# Patient Record
Sex: MALE
[De-identification: names, ages, dates, MRNs, and addresses within clinical notes are randomized per-mention and may not be internally consistent; named-entity substitution may affect disease eponyms.]

---

## 2024-11-22 ENCOUNTER — HOSPITAL ENCOUNTER (OUTPATIENT)
Dept: PHYSICAL THERAPY | Facility: CLINIC | Age: 69
Setting detail: THERAPIES SERIES
Discharge: HOME | End: 2024-11-22
Payer: MEDICARE

## 2024-11-22 PROCEDURE — 97110 THERAPEUTIC EXERCISES: CPT

## 2024-11-22 PROCEDURE — 97161 PT EVAL LOW COMPLEX 20 MIN: CPT

## 2024-11-22 NOTE — CONSULTS
[] [facility]  Outpatient Rehabilitation &  Therapy  [address]  [phone]  F: [phone] [] [facility]  Outpatient Rehabilitation &  Therapy  [address]   [address]  P: [phone]  F: [phone] [x] [facility]  Outpatient Rehabilitation &  Therapy  [address]  P: [phone]  F: [phone] [] [facility]  Outpatient Rehabilitation &  Therapy  [address]  P: [phone]  F: [phone] [] [facility]  Outpatient Rehabilitation &  Therapy  [address]   [address]   P: [phone]  F: [phone]  [] [facility]  Outpatient Rehabilitation &  Therapy  [address]   P: [phone]  F: [phone] [] [facility]  Outpatient Rehabilitation &  Therapy  [address]  [address]  P: [phone]  F: [phone] [] [facility]  Outpatient Rehabilitation &  Therapy  [address]  P: [phone]  F: [phone] [] [facility]  Outpatient Rehabilitation &  Therapy  [address]  P: [phone]  F: [phone]      Physical Therapy Spine Evaluation    Date:  2024  Patient: Hay Blake  : 1955  MRN: [id number]  Physician: Chris Ervin MD    Insurance: Primary Insurance - MEDICARE based on medical necessity; [place] Insurance - [place], no hard max, no prior auth  Medical Diagnosis: M54.59 (ICD-10-CM) - Other low back pain, M62.830 (ICD-10-CM) - Muscle spasm of back   Rehab Codes: M54.50  Onset Date: 10/11/2024  Next 's appt.: unknown    Subjective: \"Rk\" is a 69 year old male that presents with LBP that has been ongoing since \"early October\". Pt denies acute episode that caused sudden increase in pain, however it has ached for the past 6 weeks. Pt states that about 6 weeks ago, he was repairing his wife's car and \"crawling around\" on the

## 2024-11-25 ENCOUNTER — HOSPITAL ENCOUNTER (OUTPATIENT)
Dept: PHYSICAL THERAPY | Facility: CLINIC | Age: 69
Setting detail: THERAPIES SERIES
Discharge: HOME | End: 2024-11-25
Payer: MEDICARE

## 2024-11-25 PROCEDURE — 97113 AQUATIC THERAPY/EXERCISES: CPT

## 2024-11-25 NOTE — FLOWSHEET NOTE
[x] [facility] Outpatient Rehabilitation & Therapy  [address]  P: [phone]  F: [phone]     Physical Therapy Daily  Aquatic Treatment Note    Date:  2024  Patient Name:  Hay Blake    :  1955  MRN: [id number]  Physician: Chris Ervin MD                                    Insurance: Primary Insurance - MEDICARE based on medical necessity; [place] Insurance - [place], no hard max, no prior auth  Medical Diagnosis: M54.59 (ICD-10-CM) - Other low back pain, M62.830 (ICD-10-CM) - Muscle spasm of back         Rehab Codes: M54.50  Onset Date: 10/11/2024                    Next 's appt.: unknown    Visit# / total visits:   PROGRESS  NOTE VISIT 10  Cancels/No Shows: 0/0    Subjective:    Pain:  [x] Yes  [] No Location: LB Pain Rating: (0-10 scale) 2/10  Pain altered Tx:  [x] No  [] Yes  Action:  Comments: Pt c/o soreness upon arrival. States he always has pain, standing is usually the least painful.     Objective:     KEY  B = Belt G = Gloves N = Noodle   C = Cuffs K = Kickboard P = Paddles   CC = Cervical Collar L = Laps T = Theratube   DB = Dumbells M = Minutes W = Weights     Exercises/Activities  Warm-up/Amb  Dynamic Exercises    Forward 3L March    Sideways 3L Squat    Backwards 3L Retro HS curls      Retro SLR    Stretches  Braiding    Gastroc/Soleus  Heel to Toe amb    Hamstring 1'x2 Toe amb    Hip flexor  Heel amb    Piriformis      SKTC      Pec Stretch      Post Deltoid  Static Exercises UE gloves   Lumbar flex 10\"x5 Shoulder flex/ext 15   Static Exercises LE  Shoulder abd/add 15   Heel/toe raises 15 Shoulder H.  abd/add 15   Marches 15 Shoulder IR/ER    Mini-squats  Rowing    4-way hip  15 Arm Circles    Hamstring curls 15 UT shrugs/rolls    Hip Circles/Fig 8  Scap squeezes 10x5\"   Ankle ROM  Diagonals 1/2    Lunges   Elbow flex/ext      Pron/Sup    Functional Exercise  Wrist AROM    Step      Wall Push-ups  Deep H20/    SLS  Bike 2m 
Discharged

## 2024-12-02 ENCOUNTER — HOSPITAL ENCOUNTER (OUTPATIENT)
Dept: PHYSICAL THERAPY | Facility: CLINIC | Age: 69
Setting detail: THERAPIES SERIES
Discharge: HOME | End: 2024-12-02
Payer: MEDICARE

## 2024-12-02 PROCEDURE — 97113 AQUATIC THERAPY/EXERCISES: CPT

## 2024-12-02 NOTE — FLOWSHEET NOTE
AROM    Step 10x ea fwd, lat     Wall Push-ups  Deep H20/    SLS  Bike 2m   Breast Stroke on Noodle  Hip abd/add 2m   Noodle Twist  Hip flex/ext    Noodle Push down  Hip IR/ER    Kickboard push/pull 20 Knee flex/ext      Push/pull on Rail      Hang 5m   Other:    Specific Instructions for next treatment: Aquatics - Lumbar ROM, hamstring flexibility and hip  strengthening to pt tolerance.     Treatment Charges: Mins Units   []  Modalities     []  Ther Exercise     []  Manual Therapy     []  Ther Activities     [x]  Aquatics 30 2   []  Other       Assessment: [x] Progressing toward goals.  Progressed ex per above log with emphasis on core strengthening and stability. Progressions include kickboard push/pull, step ups, mini squats, as well as increased reps. Demo good tolerance, without noted pain or fatigue. Verbal cues and demo required for proper tech with mini squats and avoiding valgus collapse. Will progress as charleen.     [] No change.     [x] Other: Patient would benefit from skilled physical therapy services in order to: improve upon deficits listed above that are affecting his ability to sleep, sit, lift objects, and complete ADL's without pain and compensated movement patterns.      STG: (to be met in 8 treatments)  ? Pain: <4/10 pain consistently to improve sleep.   ? ROM: </= 58 cm from the floor with lateral lumbar flexion bilaterally to improve pts ability to bend to  an object from the floor.   ? Strength: 4+/5 JV hip abduction strength to reduce compensated movement patterns with ambulation.   Patient to be independent with home exercise program as demonstrated by performance with correct form without cues.  LTG: (to be met in 16 treatments)  Pt to improve score on the MODS to </= 15/50 points to demonstrate improvement in pts ability to lift, sit, standing, sleep and travel without increased pain    Pt. Education:  [x] Yes  [] No  [] Reviewed Prior HEP/Ed  Method of Education: [x] Verbal  [x]

## 2024-12-05 ENCOUNTER — HOSPITAL ENCOUNTER (OUTPATIENT)
Dept: PHYSICAL THERAPY | Facility: CLINIC | Age: 69
Setting detail: THERAPIES SERIES
Discharge: HOME | End: 2024-12-05
Payer: MEDICARE

## 2024-12-05 PROCEDURE — 97113 AQUATIC THERAPY/EXERCISES: CPT

## 2024-12-05 NOTE — FLOWSHEET NOTE
[x] [facility] Outpatient Rehabilitation & Therapy  [address]  P: [phone]  F: [phone]     Physical Therapy Daily  Aquatic Treatment Note    Date:  2024  Patient Name:  Hay Blake    :  1955  MRN: [id number]  Physician: Chris Ervin MD                                    Insurance: Primary Insurance - MEDICARE based on medical necessity; [place] Insurance - [place], no hard max, no prior auth  Medical Diagnosis: M54.59 (ICD-10-CM) - Other low back pain, M62.830 (ICD-10-CM) - Muscle spasm of back         Rehab Codes: M54.50  Onset Date: 10/11/2024                    Next 's appt.: unknown    Visit# / total visits:   PROGRESS  NOTE VISIT 10  Cancels/No Shows: 0/0    Subjective:    Pain:  [x] Yes  [] No Location: LB Pain Rating: (0-10 scale) 1-2/10  Pain altered Tx:  [x] No  [] Yes  Action:  Comments: Pt reporting \"the usual\" pain levels this am. Notes minimal soreness after last treatment     Objective:   Precautions [] No  [x] Yes: Partial lumbarization of the sacrum.   KEY  B = Belt G = Gloves N = Noodle   C = Cuffs K = Kickboard P = Paddles   CC = Cervical Collar L = Laps T = Theratube   DB = Dumbells M = Minutes W = Weights     Exercises/Activities  Warm-up/Amb 12/5 Dynamic Exercises 12/5   Forward 3L March 3L   Sideways 3L Squat    Backwards 3L Retro HS curls      Retro SLR    Stretches  Braiding    Gastroc/Soleus  Heel to Toe amb    Hamstring 1'x2 Toe amb    Hip flexor  Heel amb    Piriformis      SKTC      Pec Stretch      Post Deltoid  Static Exercises UE Open paddles   Lumbar flex 10\"x5 Shoulder flex/ext 20   Static Exercises LE  Shoulder abd/add 20   Heel/toe raises 30 Shoulder H.  abd/add 20    30 Shoulder IR/ER 20   Mini-squats 30 Rowing    4-way hip  30 Arm Circles    Hamstring curls 30 UT shrugs/rolls    Hip Circles/Fig 8  Scap squeezes 10x5\"   Ankle ROM  Diagonals 1/2    Lunges  10 ea Elbow flex/ext      Pron/Sup    Functional

## 2024-12-09 ENCOUNTER — HOSPITAL ENCOUNTER (OUTPATIENT)
Dept: PHYSICAL THERAPY | Facility: CLINIC | Age: 69
Setting detail: THERAPIES SERIES
Discharge: HOME | End: 2024-12-09
Payer: MEDICARE

## 2024-12-09 PROCEDURE — 97113 AQUATIC THERAPY/EXERCISES: CPT

## 2024-12-09 NOTE — FLOWSHEET NOTE
[x] [facility] Outpatient Rehabilitation & Therapy  [address]  P: [phone]  F: [phone]     Physical Therapy Daily  Aquatic Treatment Note    Date:  2024  Patient Name:  Hay Blake    :  1955  MRN: [id number]  Physician: Chris Ervin MD                                    Insurance: Primary Insurance - MEDICARE based on medical necessity; [place] Insurance [place], no hard max, no prior auth  Medical Diagnosis: M54.59 (ICD-10-CM) - Other low back pain, M62.830 (ICD-10-CM) - Muscle spasm of back         Rehab Codes: M54.50  Onset Date: 10/11/2024                    Next 's appt.: unknown    Visit# / total visits:   PROGRESS  NOTE VISIT 10  Cancels/No Shows: 0/0    Subjective:    Pain:  [x] Yes  [] No Location: LB Pain Rating: (0-10 scale) 1-2/10  Pain altered Tx:  [x] No  [] Yes  Action:  Comments: Pt reports back was very achy last night keeping him up most of the night, but then pain eased this morning on its own.     Objective:   Precautions [] No  [x] Yes: Partial lumbarization of the sacrum.   KEY  B = Belt G = Gloves N = Noodle   C = Cuffs K = Kickboard P = Paddles   CC = Cervical Collar L = Laps T = Theratube   DB = Dumbells M = Minutes W = Weights     Exercises/Activities  Warm-up/Amb  Dynamic Exercises    Forward 3L March 3L   Sideways 3L Squat    Backwards 3L Retro HS curls      Retro SLR    Stretches  Braiding    Gastroc/Soleus  Heel to Toe amb    Hamstring 1'x2 Toe amb    Hip flexor  Heel amb    Piriformis      SKTC      Pec Stretch      Post Deltoid  Static Exercises UE Open paddles   Lumbar flex 10\"x5 Shoulder flex/ext 30   QL stretch*      Static Exercises LE  Shoulder abd/add 30   Heel/toe raises 30 Shoulder H.  abd/add 30   Marches 30 Shoulder IR/ER 30   Mini-squats 30 Rowing    4-way hip  30 Arm Circles    Hamstring curls 30 UT shrugs/rolls    Hip Circles/Fig 8  Scap squeezes 10x5\"   Ankle ROM  Diagonals 1/2    Lunges  20

## 2024-12-12 ENCOUNTER — HOSPITAL ENCOUNTER (OUTPATIENT)
Dept: PHYSICAL THERAPY | Facility: CLINIC | Age: 69
Setting detail: THERAPIES SERIES
Discharge: HOME | End: 2024-12-12
Payer: MEDICARE

## 2024-12-12 PROCEDURE — 97113 AQUATIC THERAPY/EXERCISES: CPT

## 2024-12-12 NOTE — FLOWSHEET NOTE
[x] [facility] Outpatient Rehabilitation & Therapy  [address]  P: [phone]  F: [phone]     Physical Therapy Daily  Aquatic Treatment Note    Date:  2024  Patient Name:  Hay Blake    :  1955  MRN: [id number]  Physician: Chris Ervin MD                                    Insurance: Primary Insurance - MEDICARE based on medical necessity; [place] Insurance - [place], no hard max, no prior auth  Medical Diagnosis: M54.59 (ICD-10-CM) - Other low back pain, M62.830 (ICD-10-CM) - Muscle spasm of back         Rehab Codes: M54.50  Onset Date: 10/11/2024                    Next 's appt.: unknown    Visit# / total visits:   PROGRESS  NOTE VISIT 10  Cancels/No Shows: 0/0    Subjective:    Pain:  [] Yes  [x] No Location: LB Pain Rating: (0-10 scale) 0/10  Pain altered Tx:  [x] No  [] Yes  Action:  Comments: Pt reports back has felt really good the last couple days with minimal pain.     Objective:   Precautions [] No  [x] Yes: Partial lumbarization of the sacrum.   KEY  B = Belt G = Gloves N = Noodle   C = Cuffs K = Kickboard P = Paddles   CC = Cervical Collar L = Laps T = Theratube   DB = Dumbells M = Minutes W = Weights     Exercises/Activities  Warm-up/Amb  Dynamic Exercises    Forward 3L March 3L   Sideways 3L Squat    Backwards 3L Retro HS curls      Retro SLR    Stretches  Braiding    Gastroc/Soleus  Heel to Toe amb    Hamstring 1'x2 Toe amb    Hip flexor  Heel amb    Piriformis      SKTC      Pec Stretch      Post Deltoid  Static Exercises UE Open paddles   Lumbar flex 10\"x5 Shoulder flex/ext 30   QL stretch*      Static Exercises LE  Shoulder abd/add 30   Heel/toe raises 35 Shoulder H.  abd/add 30    35 Shoulder IR/ER 30   Mini-squats 35 Rowing    4-way hip  35 Arm Circles    Hamstring curls 35 UT shrugs/rolls    Hip Circles/Fig 8  Scap squeezes 10x5\"   Ankle ROM  Diagonals 1/2    Lunges  30 ea Elbow flex/ext      Pron/Sup

## 2024-12-16 ENCOUNTER — HOSPITAL ENCOUNTER (OUTPATIENT)
Dept: PHYSICAL THERAPY | Facility: CLINIC | Age: 69
Setting detail: THERAPIES SERIES
Discharge: HOME | End: 2024-12-16
Payer: MEDICARE

## 2024-12-16 PROCEDURE — 97110 THERAPEUTIC EXERCISES: CPT

## 2024-12-16 NOTE — PROGRESS NOTES
1 x daily - 7 x weekly - 1 sets - 10 reps - 5 seconds hold  - Supine March  - 1 x daily - 7 x weekly - 1 sets - 10 reps  - Supine Active Straight Leg Raise  - 3 x weekly - 1-2 sets - 5-10 reps  - Seated Table Hamstring Stretch  - 1 x daily - 7 x weekly - 1 sets - 2 reps - 30 seconds hold  - Quadruped Thoracic Lumbar Side Bend  - 1 x daily - 5-10 reps - 10 seconds hold  - Quadruped Leg Lifts  - 1 x daily - 5-10 reps - 5 seconds hold  - Bird Dog  - 1 x daily - 5-10 reps - 5 seconds hold  - Prone Press Up On Elbows  - 1 x daily - 10 reps - 5 seconds hold  - Sidelying Hip Abduction with Resistance at Thighs  - 3 x weekly - 1-2 sets - 10 reps  - Standing Hip Flexion with Counter Support  - 1 x daily - 3 x weekly - 2 sets - 10 reps  - Standing Hip Abduction with Counter Support  - 1 x daily - 3 x weekly - 2 sets - 10 reps  - Standing Hip Extension with Counter Support  - 1 x daily - 3 x weekly - 2 sets - 10 reps    Plan: [x]  Patient to continue with his home program.  D/C on 1/31/25 if we do not hear back from him.         Time In:0900            Time Out: 0942    Electronically signed by:  Kimberly Martinez PT